# Patient Record
Sex: MALE | Race: WHITE | NOT HISPANIC OR LATINO
[De-identification: names, ages, dates, MRNs, and addresses within clinical notes are randomized per-mention and may not be internally consistent; named-entity substitution may affect disease eponyms.]

---

## 2022-05-10 PROBLEM — Z00.00 ENCOUNTER FOR PREVENTIVE HEALTH EXAMINATION: Status: ACTIVE | Noted: 2022-05-10

## 2022-05-12 ENCOUNTER — APPOINTMENT (OUTPATIENT)
Dept: ORTHOPEDIC SURGERY | Facility: CLINIC | Age: 52
End: 2022-05-12
Payer: OTHER MISCELLANEOUS

## 2022-05-12 VITALS — WEIGHT: 272 LBS | HEIGHT: 66 IN | BODY MASS INDEX: 43.71 KG/M2

## 2022-05-12 PROCEDURE — 99072 ADDL SUPL MATRL&STAF TM PHE: CPT

## 2022-05-12 PROCEDURE — L3670: CPT | Mod: 1L

## 2022-05-12 PROCEDURE — A4565: CPT

## 2022-05-12 PROCEDURE — 99214 OFFICE O/P EST MOD 30 MIN: CPT

## 2022-05-12 NOTE — WORK
[Moderate Partial] : moderate partial [Does not reveal pre-existing condition(s) that may affect treatment/prognosis] : does not reveal pre-existing condition(s) that may affect treatment/prognosis [Patient] : patient [No Rx restrictions] : No Rx restrictions. [I provided the services listed above] :  I provided the services listed above. [FreeTextEntry1] : good needs surgery

## 2022-05-12 NOTE — DISCUSSION/SUMMARY
[de-identified] : Patient allowed to gently start resuming activities. \par Discussed change to medication prescription and usage. \par Bracing options discussed with patient. \par Activity modification as needed\par Discussed poss future surgery, pt deciding\par request comp auth for left shoulder arthroscopy left shoulder and SAD surgical discussion quesitons answered, no guarantees and shoulder abduction pillow sling

## 2022-05-12 NOTE — HISTORY OF PRESENT ILLNESS
[4] : 4 [8] : 8 [Full time] : Work status: full time [] : yes [de-identified] : pt is here today for a WC follow up of his left shoulder. pt states he has his good day and bad days. Has known labral tear and IS and not getting better [FreeTextEntry1] : left shoulder  [de-identified] : none

## 2022-06-07 ENCOUNTER — FORM ENCOUNTER (OUTPATIENT)
Age: 52
End: 2022-06-07

## 2022-06-08 ENCOUNTER — FORM ENCOUNTER (OUTPATIENT)
Age: 52
End: 2022-06-08

## 2022-07-05 ENCOUNTER — APPOINTMENT (OUTPATIENT)
Age: 52
End: 2022-07-05

## 2022-07-05 RX ORDER — OXYCODONE AND ACETAMINOPHEN 5; 325 MG/1; MG/1
5-325 TABLET ORAL
Qty: 40 | Refills: 0 | Status: ACTIVE | COMMUNITY
Start: 2022-07-05 | End: 1900-01-01

## 2022-07-14 ENCOUNTER — APPOINTMENT (OUTPATIENT)
Dept: ORTHOPEDIC SURGERY | Facility: CLINIC | Age: 52
End: 2022-07-14

## 2022-08-01 ENCOUNTER — FORM ENCOUNTER (OUTPATIENT)
Age: 52
End: 2022-08-01

## 2022-08-26 ENCOUNTER — LABORATORY RESULT (OUTPATIENT)
Age: 52
End: 2022-08-26

## 2022-08-30 ENCOUNTER — APPOINTMENT (OUTPATIENT)
Age: 52
End: 2022-08-30

## 2022-08-30 PROCEDURE — 29820 SHO ARTHRS SRG PRTL SYNVCT: CPT | Mod: AS,59,LT

## 2022-08-30 PROCEDURE — 29820 SHO ARTHRS SRG PRTL SYNVCT: CPT | Mod: 59,LT

## 2022-08-30 PROCEDURE — 29824 SHO ARTHRS SRG DSTL CLAVICLC: CPT | Mod: 59,LT

## 2022-08-30 PROCEDURE — 29807 SHO ARTHRS SRG RPR SLAP LES: CPT | Mod: LT

## 2022-08-30 PROCEDURE — 29826 SHO ARTHRS SRG DECOMPRESSION: CPT | Mod: AS,LT

## 2022-08-30 PROCEDURE — 29824 SHO ARTHRS SRG DSTL CLAVICLC: CPT | Mod: AS,59,LT

## 2022-08-30 PROCEDURE — 29826 SHO ARTHRS SRG DECOMPRESSION: CPT | Mod: LT

## 2022-08-30 PROCEDURE — 29807 SHO ARTHRS SRG RPR SLAP LES: CPT | Mod: AS,LT

## 2022-08-30 RX ORDER — ASPIRIN 81 MG/1
81 TABLET, DELAYED RELEASE ORAL DAILY
Qty: 15 | Refills: 0 | Status: COMPLETED | COMMUNITY
Start: 2022-08-30 | End: 2022-09-14

## 2022-08-30 RX ORDER — OXYCODONE AND ACETAMINOPHEN 5; 325 MG/1; MG/1
5-325 TABLET ORAL
Qty: 30 | Refills: 0 | Status: COMPLETED | COMMUNITY
Start: 2022-08-30 | End: 2022-09-02

## 2022-09-08 ENCOUNTER — APPOINTMENT (OUTPATIENT)
Dept: ORTHOPEDIC SURGERY | Facility: CLINIC | Age: 52
End: 2022-09-08

## 2022-09-08 VITALS — BODY MASS INDEX: 45.32 KG/M2 | WEIGHT: 282 LBS | HEIGHT: 66 IN

## 2022-09-08 PROCEDURE — 99024 POSTOP FOLLOW-UP VISIT: CPT

## 2022-09-08 NOTE — DISCUSSION/SUMMARY
[de-identified] : Surgical findings reviewed with the patient.\par Activities and restrictyions discussed.\par Start PT and dc sling the week of 9/19/22\par HEP reviewed.\par REturn in 5 weeks\par \par Please let this serve as a formal request for comp auth for PT program for the left shoulder. \par has thrombophlebiiis L forearm oi=on antibiotic and shcedule for US today

## 2022-09-08 NOTE — WORK
[Total] : total [Does not reveal pre-existing condition(s) that may affect treatment/prognosis] : does not reveal pre-existing condition(s) that may affect treatment/prognosis [Cannot return to work because ________] : cannot return to work because [unfilled] [Unknown at this time] : : unknown at this time [Rx may affect patient's ability to return to work, make patient drowsy, or other issue] : Rx may affect patient's ability to return to work, make patient drowsy, or other issue. [I provided the services listed above] :  I provided the services listed above. [FreeTextEntry1] : good

## 2022-09-08 NOTE — HISTORY OF PRESENT ILLNESS
[7] : 7 [4] : 4 [Dull/Aching] : dull/aching [Sharp] : sharp [de-identified] : Patient s/p left shoulder arthroscopic labral repair, SAD,DCE and partial synovectomy on 8/30/22. He presents in the sling, denies fever/chills. PAin is manageable. 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 [] : This patient has had an injection before: no [FreeTextEntry1] : left shoulder [de-identified] : 08/30/2022 [de-identified] : left shoulder arthroscopy

## 2022-10-06 ENCOUNTER — APPOINTMENT (OUTPATIENT)
Dept: ORTHOPEDIC SURGERY | Facility: CLINIC | Age: 52
End: 2022-10-06

## 2022-10-26 ENCOUNTER — FORM ENCOUNTER (OUTPATIENT)
Age: 52
End: 2022-10-26

## 2022-11-21 ENCOUNTER — NON-APPOINTMENT (OUTPATIENT)
Age: 52
End: 2022-11-21

## 2022-11-21 ENCOUNTER — APPOINTMENT (OUTPATIENT)
Dept: ORTHOPEDIC SURGERY | Facility: CLINIC | Age: 52
End: 2022-11-21

## 2022-11-21 VITALS — WEIGHT: 282 LBS | BODY MASS INDEX: 45.32 KG/M2 | HEIGHT: 66 IN

## 2022-11-21 PROCEDURE — 99024 POSTOP FOLLOW-UP VISIT: CPT

## 2022-11-21 NOTE — PHYSICAL EXAM
[Left] : left shoulder [] : motor and sensory intact distally [FreeTextEntry8] : sl [FreeTextEntry9] : na [de-identified] : na [TWNoteComboBox7] : active forward flexion 165 degrees [TWNoteComboBox6] : internal rotation 10 degrees

## 2022-11-21 NOTE — HISTORY OF PRESENT ILLNESS
[Dull/Aching] : dull/aching [Sharp] : sharp [de-identified] : Patient s/p left shoulder arthroscopic labral repair, SAD,DCE and partial synovectomy on 8/30/22. He presents in the sling, denies fever/chills. PAin is manageable. He has some dec IR and is not working ( ) [7] : 7 [4] : 4 [] : This patient has had an injection before: no [FreeTextEntry1] : left shoulder [de-identified] : Physical therapy [de-identified] : 08/30/2022 [de-identified] : left shoulder arthroscopy

## 2023-01-03 ENCOUNTER — APPOINTMENT (OUTPATIENT)
Dept: ORTHOPEDIC SURGERY | Facility: CLINIC | Age: 53
End: 2023-01-03
Payer: OTHER MISCELLANEOUS

## 2023-01-03 ENCOUNTER — NON-APPOINTMENT (OUTPATIENT)
Age: 53
End: 2023-01-03

## 2023-01-03 VITALS — BODY MASS INDEX: 45.32 KG/M2 | HEIGHT: 66 IN | WEIGHT: 282 LBS

## 2023-01-03 DIAGNOSIS — M75.42 IMPINGEMENT SYNDROME OF LEFT SHOULDER: ICD-10-CM

## 2023-01-03 PROCEDURE — 99213 OFFICE O/P EST LOW 20 MIN: CPT

## 2023-01-03 PROCEDURE — 99072 ADDL SUPL MATRL&STAF TM PHE: CPT

## 2023-01-03 NOTE — WORK
[Total] : total [Does not reveal pre-existing condition(s) that may affect treatment/prognosis] : does not reveal pre-existing condition(s) that may affect treatment/prognosis [Can return to work without limitations on ______] : can return to work without limitations on [unfilled] [Patient] : patient [Rx may affect patient's ability to return to work, make patient drowsy, or other issue] : Rx may affect patient's ability to return to work, make patient drowsy, or other issue. [I provided the services listed above] :  I provided the services listed above. [FreeTextEntry1] : good

## 2023-01-03 NOTE — HISTORY OF PRESENT ILLNESS
[7] : 7 [4] : 4 [Dull/Aching] : dull/aching [Sharp] : sharp [Not working due to injury] : Work status: not working due to injury [de-identified] : Patient s/p left shoulder arthroscopic labral repair, SAD,DCE and partial synovectomy on 8/30/22. He is in PT bu not last 2 weeks a little more sore, some dec IR [] : Post Surgical Visit: yes [FreeTextEntry1] : left shoulder [FreeTextEntry5] : WC follow up\par Patient states PT was helping before. WC has recently denied PT [de-identified] : Physical therapy [de-identified] : 08/30/2022 [de-identified] : left shoulder arthroscopy

## 2023-01-03 NOTE — RETURN TO WORK/SCHOOL
[Return Date: _____] : as of [unfilled].  This has been discussed in detail with ~Franklin~ and ~he/she~ understands this. [Full Duty] : full duty [Work] : work [Other: ___] : [unfilled]

## 2023-01-03 NOTE — PHYSICAL EXAM
[Left] : left shoulder [] : motor and sensory intact distally [FreeTextEntry8] : sl [FreeTextEntry9] : na [de-identified] : na [TWNoteComboBox7] : active forward flexion 180 degrees [TWNoteComboBox6] : internal rotation 15 degrees

## 2023-02-01 ENCOUNTER — APPOINTMENT (OUTPATIENT)
Dept: ORTHOPEDIC SURGERY | Facility: CLINIC | Age: 53
End: 2023-02-01

## 2023-02-02 ENCOUNTER — APPOINTMENT (OUTPATIENT)
Dept: ORTHOPEDIC SURGERY | Facility: CLINIC | Age: 53
End: 2023-02-02
Payer: OTHER MISCELLANEOUS

## 2023-02-02 VITALS — HEIGHT: 66 IN | BODY MASS INDEX: 45.32 KG/M2 | WEIGHT: 282 LBS

## 2023-02-02 PROCEDURE — 99213 OFFICE O/P EST LOW 20 MIN: CPT

## 2023-02-02 PROCEDURE — 99072 ADDL SUPL MATRL&STAF TM PHE: CPT

## 2023-02-02 NOTE — HISTORY OF PRESENT ILLNESS
[4] : 4 [Dull/Aching] : dull/aching [Sharp] : sharp [de-identified] : Patient s/p left shoulder arthroscopic labral repair, SAD,DCE and partial synovectomy on 8/30/22. He is in PT and is working [7] : 7 [Not working due to injury] : Work status: not working due to injury [] : Post Surgical Visit: yes [FreeTextEntry1] : left shoulder [FreeTextEntry5] : WC follow up\par Patient states PT was helping before. WC has recently denied PT [de-identified] : Physical therapy [de-identified] : 08/30/2022 [de-identified] : left shoulder arthroscopy

## 2023-02-02 NOTE — WORK
[Moderate Partial] : moderate partial [Does not reveal pre-existing condition(s) that may affect treatment/prognosis] : does not reveal pre-existing condition(s) that may affect treatment/prognosis [Can return to work without limitations on ______] : can return to work without limitations on [unfilled] [Patient] : patient [Rx may affect patient's ability to return to work, make patient drowsy, or other issue] : Rx may affect patient's ability to return to work, make patient drowsy, or other issue. [I provided the services listed above] :  I provided the services listed above. [FreeTextEntry1] : good

## 2023-02-02 NOTE — PHYSICAL EXAM
[Left] : left shoulder [] : motor and sensory intact distally [FreeTextEntry8] : sl [FreeTextEntry9] : na [de-identified] : na [TWNoteComboBox7] : active forward flexion 180 degrees [TWNoteComboBox6] : internal rotation 35 degrees

## 2023-03-22 ENCOUNTER — APPOINTMENT (OUTPATIENT)
Dept: ORTHOPEDIC SURGERY | Facility: CLINIC | Age: 53
End: 2023-03-22

## 2023-03-29 ENCOUNTER — APPOINTMENT (OUTPATIENT)
Dept: ORTHOPEDIC SURGERY | Facility: CLINIC | Age: 53
End: 2023-03-29
Payer: OTHER MISCELLANEOUS

## 2023-03-29 VITALS — HEIGHT: 66 IN | WEIGHT: 282 LBS | BODY MASS INDEX: 45.32 KG/M2

## 2023-03-29 DIAGNOSIS — S43.432D SUPERIOR GLENOID LABRUM LESION OF LEFT SHOULDER, SUBSEQUENT ENCOUNTER: ICD-10-CM

## 2023-03-29 PROCEDURE — 99213 OFFICE O/P EST LOW 20 MIN: CPT

## 2023-03-29 NOTE — WORK
[Mild Partial] : mild partial [Does not reveal pre-existing condition(s) that may affect treatment/prognosis] : does not reveal pre-existing condition(s) that may affect treatment/prognosis [Can return to work without limitations on ______] : can return to work without limitations on [unfilled] [Patient] : patient [Rx may affect patient's ability to return to work, make patient drowsy, or other issue] : Rx may affect patient's ability to return to work, make patient drowsy, or other issue. [I provided the services listed above] :  I provided the services listed above. [FreeTextEntry1] : good

## 2023-03-29 NOTE — HISTORY OF PRESENT ILLNESS
[de-identified] : Patient s/p left shoulder arthroscopic labral repair, SAD,DCE and partial synovectomy on 8/30/22. He is not in PT and is working [7] : 7 [4] : 4 [Dull/Aching] : dull/aching [Sharp] : sharp [Not working due to injury] : Work status: not working due to injury [] : Post Surgical Visit: yes [FreeTextEntry1] : left shoulder [FreeTextEntry5] : WC follow up\par Patient states PT was helping before. WC has recently denied PT [de-identified] : Physical therapy [de-identified] : 08/30/2022 [de-identified] : left shoulder arthroscopy

## 2023-03-29 NOTE — PHYSICAL EXAM
[Left] : left shoulder [] : motor and sensory intact distally [FreeTextEntry8] : sl [FreeTextEntry9] : na [de-identified] : na [TWNoteComboBox7] : active forward flexion 180 degrees [TWNoteComboBox6] : internal rotation 30 degrees [de-identified] : external rotation 60 degrees

## 2023-07-31 ENCOUNTER — APPOINTMENT (OUTPATIENT)
Dept: ORTHOPEDIC SURGERY | Facility: CLINIC | Age: 53
End: 2023-07-31
Payer: OTHER MISCELLANEOUS

## 2023-07-31 DIAGNOSIS — Z98.890 OTHER SPECIFIED POSTPROCEDURAL STATES: ICD-10-CM

## 2023-07-31 PROCEDURE — 99455 WORK RELATED DISABILITY EXAM: CPT

## 2023-08-01 VITALS — WEIGHT: 282 LBS | HEIGHT: 66 IN | BODY MASS INDEX: 45.32 KG/M2

## 2023-08-02 PROBLEM — Z98.890 S/P ARTHROSCOPY OF SHOULDER: Status: ACTIVE | Noted: 2022-08-30

## 2023-08-02 NOTE — PHYSICAL EXAM
[Left] : left shoulder [] : negative impingement testing [FreeTextEntry8] : sl [FreeTextEntry9] : na [de-identified] : na [TWNoteComboBox7] : active forward flexion 180 degrees [TWNoteComboBox6] : internal rotation 35 degrees [de-identified] : external rotation 45 degrees

## 2023-08-02 NOTE — REASON FOR VISIT
[FreeTextEntry2] : follow up- left shoulder doing ok workers comp- DOI (11/22/21) Patient had sx to left shoulder 08/30/22

## 2023-08-02 NOTE — HISTORY OF PRESENT ILLNESS
[Gradual] : gradual [7] : 7 [4] : 4 [Dull/Aching] : dull/aching [Sharp] : sharp [Constant] : constant [Not working due to injury] : Work status: not working due to injury [de-identified] : Patient s/p left shoulder arthroscopic labral repair, SAD,DCE and partial synovectomy on 8/30/22. He is not in PT and is working, improving [] : This patient has had an injection before: no [FreeTextEntry1] : left shoulder [FreeTextEntry3] : 11/22/21 [FreeTextEntry5] : WC follow up\par  Patient states PT was helping before. WC has recently denied PT [de-identified] : Physical therapy [de-identified] : 08/30/2022 [de-identified] : left shoulder arthroscopy